# Patient Record
(demographics unavailable — no encounter records)

---

## 2024-11-01 NOTE — DISCUSSION/SUMMARY
[EKG obtained to assist in diagnosis and management of assessed problem(s)] : EKG obtained to assist in diagnosis and management of assessed problem(s) [FreeTextEntry1] : No further cardiac testing required prior to cataract surgery If needed, aspirin may be held 5 days prior to procedure and resumed after Cont Lipitor Cont Valsartan-HCTZ Follow-up 6-months

## 2024-11-01 NOTE — ASSESSMENT
[FreeTextEntry1] : NSTEMI s/p PCI LAD / CX (2008). LM / Multivessel CAD s/p 3v CABG (12/2021). No angina. nL LVSF.  BP mildly elevated.  Intermediate risk patient for perioperative cardiac events Low risk procedure No cardiac decompensation

## 2024-11-01 NOTE — PHYSICAL EXAM
[de-identified] : reg, nL s1/s2, no m/r/g [de-identified] : well appearing, no distress [de-identified] : CTA [de-identified] : alert, normal affect, logical conversation

## 2024-11-01 NOTE — REASON FOR VISIT
[FreeTextEntry1] : Feels well.  [place]  Does push-ups.  No exertional symptoms.  No interval cardiac symptoms.  Breathing relatively comfortable.  Tolerating medications.  Weight stable.  Scheduled for cataract surgery.

## 2025-05-02 NOTE — PHYSICAL EXAM
[de-identified] : well appearing, no distress [de-identified] : reg, nL s1/s2, no m/r/g [de-identified] : CTA [de-identified] : alert, normal affect, logical conversation

## 2025-05-02 NOTE — DISCUSSION/SUMMARY
[FreeTextEntry1] : Continue aspirin Continue Lipitor Continue metoprolol Increase valsartan, continue hydrochlorothiazide ECHO Lexiscan nuclear stress test Labs Follow-up 3-months [EKG obtained to assist in diagnosis and management of assessed problem(s)] : EKG obtained to assist in diagnosis and management of assessed problem(s)

## 2025-05-02 NOTE — ASSESSMENT
[FreeTextEntry1] : NSTEMI s/p PCI LAD / CX (2008). LM / Multivessel CAD s/p 3v CABG (12/2021).  New exertional dyspnea.  Possible anginal equivalent.  BP not controlled (repeat 160/80).

## 2025-05-02 NOTE — REASON FOR VISIT
[FreeTextEntry1] : Feels "older."  Wrist and back arthritis bothering him.  Still walks [place], however, new exertional dyspnea at brisk pace and with stairs.  PMD started metoprolol for uncontrolled blood pressure, and metformin for diabetes.  Underwent cataract surgery without complication.  Weight stable.    Labs reviewed (1/2025): A1c 9.0 LDL 92, triglycerides 129, HDL 43 CBC, CMP unremarkable